# Patient Record
Sex: MALE | Race: WHITE | NOT HISPANIC OR LATINO | URBAN - METROPOLITAN AREA
[De-identification: names, ages, dates, MRNs, and addresses within clinical notes are randomized per-mention and may not be internally consistent; named-entity substitution may affect disease eponyms.]

---

## 2017-08-23 ENCOUNTER — OFFICE VISIT - RIVER FALLS (OUTPATIENT)
Dept: FAMILY MEDICINE | Facility: CLINIC | Age: 42
End: 2017-08-23

## 2017-08-23 ASSESSMENT — MIFFLIN-ST. JEOR: SCORE: 1507.68

## 2020-06-08 ENCOUNTER — OFFICE VISIT - RIVER FALLS (OUTPATIENT)
Dept: FAMILY MEDICINE | Facility: CLINIC | Age: 45
End: 2020-06-08

## 2022-02-11 VITALS
HEIGHT: 67 IN | SYSTOLIC BLOOD PRESSURE: 130 MMHG | HEART RATE: 92 BPM | BODY MASS INDEX: 23.32 KG/M2 | OXYGEN SATURATION: 98 % | DIASTOLIC BLOOD PRESSURE: 84 MMHG | WEIGHT: 148.6 LBS

## 2022-02-11 VITALS
BODY MASS INDEX: 23.49 KG/M2 | DIASTOLIC BLOOD PRESSURE: 82 MMHG | OXYGEN SATURATION: 98 % | TEMPERATURE: 98.1 F | WEIGHT: 150 LBS | SYSTOLIC BLOOD PRESSURE: 134 MMHG | HEART RATE: 79 BPM

## 2022-02-16 NOTE — NURSING NOTE
Comprehensive Intake Entered On:  6/8/2020 2:30 PM CDT    Performed On:  6/8/2020 2:16 PM CDT by Taya Sheikh   Chief Complaint :   c/o R wrist pain x 5 days.   Weight Measured :   150.0 lb(Converted to: 150 lb 0 oz, 68.04 kg)    Systolic Blood Pressure :   134 mmHg   Diastolic Blood Pressure :   82 mmHg   Mean Arterial Pressure :   99 mmHg   Peripheral Pulse Rate :   79 bpm   BP Site :   Right arm   BP Method :   Manual   HR Method :   Electronic   Temperature Tympanic :   98.1 DegF(Converted to: 36.7 DegC)    Oxygen Saturation :   98 %   Taya Sheikh - 6/8/2020 2:16 PM CDT   Health Status   Allergies Verified? :   Yes   Medication History Verified? :   Yes   Medical History Verified? :   Yes   Pre-Visit Planning Status :   N/A   Tobacco Use? :   Never smoker   Taya Sheikh - 6/8/2020 2:16 PM CDT   Consents   Consent for Immunization Exchange :   Consent Granted   Consent for Immunizations to Providers :   Consent Granted   Taya Sheikh - 6/8/2020 2:16 PM CDT   Meds / Allergies   (As Of: 6/8/2020 2:30:08 PM CDT)   Allergies (Active)   No known allergies  Estimated Onset Date:   Unspecified ; Created By:   Linda Guy; Reaction Status:   Active ; Category:   Drug ; Substance:   No known allergies ; Type:   Allergy ; Updated By:   Linda Guy; Source:   Patient ; Reviewed Date:   6/8/2020 2:28 PM CDT        Medication List   (As Of: 6/8/2020 2:30:08 PM CDT)   No Known Home Medications     Taya Sheikh - 6/8/2020 2:28:14 PM           ID Risk Screen   Recent Travel History :   No recent travel   Family Member Travel History :   No recent travel   Other Exposure to Infectious Disease :   Unknown   Taya Sheikh - 6/8/2020 2:16 PM CDT

## 2022-02-16 NOTE — PROGRESS NOTES
Chief Complaint    c/o R wrist pain x 5 days.  History of Present Illness      Patient complains of 4 days of right wrist pain.  Discomfort is on the hyperthenar aspect of the wrist.  Worse with movement.  No morning stiffness.  No weakness or paresthesias.  No trauma.  No other current joint complaints.  No rash or diarrhea.  No fever or chills.  No history of tick exposure.  Had a episode of bilateral wrist pain and swelling after several days of intense activity involving the hands using a sandblaster that resolved over a period of time.  He is taken nothing for his discomfort.  Review of Systems      No fever, chills, rash, swollen joints, morning stiffness, dyspnea, cough, chest pain, diarrhea, urinary complaints.  Physical Exam   Vitals & Measurements    T: 98.1   F (Tympanic)  HR: 79(Peripheral)  BP: 134/82  SpO2: 98%     WT: 150.0 lb       Patient is a healthy-appearing man in no distress.  Alert and oriented.  The wrist both appear normal there is no swelling in either hand or wrist.  No warmth erythema or tenderness in either wrist.  Range of motion on both sides is normal.  Strength and sensation intact in both upper extremities.  No other joint pathology.  Assessment/Plan       Acute pain of right wrist (M25.531)         X-ray and exam negative. OTC analgesic prn. Return if not better.         Ordered:          15951 office outpatient new 30 minutes (Charge), Quantity: 1, Acute pain of right wrist          XR Wrist Min 3 Views Right (Request), Priority: STAT, Acute pain of right wrist           Patient Information     Name:JESSICA ROMERO      Address:      [address] [place] [id number]     Sex:Male     YOB: 1975     Phone:[phone]     MRN:[id number]     FIN:[id number]     Location:[facility]     Date of Service:06/08/2020      Primary Care Physician:       Douglas Schaffer MD, [phone]      Attending Physician:       Aaron Latham MD  [phone]  Problem List/Past Medical History    Ongoing     No qualifying data    Historical     No qualifying data  Medications   No active medications  Allergies    No known allergies  Social History    Smoking Status - 06/08/2020     Never smoker     Alcohol - Current, 06/20/2010      Current, 1-2 times per month, 06/20/2010     Home/Environment      Marital status: Single., 06/06/2013     Substance Abuse - Denies Substance Abuse, 06/20/2010      Never, 06/06/2013     Tobacco - Denies Tobacco Use, 06/20/2010      Never, 06/06/2013  Family History    Breast Cancer: Mother.    Ovarian cancer.: Mother.  Immunizations      Vaccine Date Status          Td 01/01/1999 Recorded  Diagnostic Results   X-ray negative.

## 2022-02-16 NOTE — PROGRESS NOTES
Patient:   JESSICA ROMERO            MRN: [id number]            FIN: [id number]               Age:   41 years     Sex:  Male     :  1975   Associated Diagnoses:   Acute foreign body of left eye   Author:   Carlos Chacko MD      Chief Complaint   2017 6:53 PM CDT    Pt here for concern with something in his eye, he can feel something in his left eye, was working on car, unsure if rust or dirt, this occured a short time ago      History of Present Illness   see chief complaint as noted above and confirmed with the patient     41 year old male here today with concern of something in his left eye. He was working on his car within the past hour when he felt something fall into his left eye. He is unsure of what it is could, possibly rust or dust. His eye is not running, denies other symptoms.       Review of Systems   Constitutional:  No fever.    Eye:  Foreign Body in Left eye , No discharge.    Integumentary:  No rash.    Neurologic:  Alert and oriented X4.             Health Status   Allergies:    Allergic Reactions (Selected)  No known allergies   Medications:  (Selected)      Problem list:    No problem items selected or recorded.      Histories   Past Medical History:    No active or resolved past medical history items have been selected or recorded.   Family History:    No family history items have been selected or recorded.   Procedure history:    No active procedure history items have been selected or recorded.   Social History:        Alcohol Assessment: Current            Current, 1-2 times per month      Tobacco Assessment: Denies Tobacco Use            Never      Substance Abuse Assessment: Denies Substance Abuse            Never      Home and Environment Assessment            Marital status: Single.        Physical Examination   Vital Signs   2017 6:53 PM CDT Peripheral Pulse Rate 92 bpm    Pulse Site Radial artery    Systolic Blood Pressure 130 mmHg    Diastolic Blood Pressure 84  mmHg    Mean Arterial Pressure 99 mmHg    BP Site Right arm    Oxygen Saturation 98 %      Measurements from flowsheet : Measurements   8/23/2017 6:53 PM CDT Height Measured - Standard 67 in    Weight Measured - Standard 148.6 lb    BSA 1.78 m2    Body Mass Index 23.27 kg/m2      General:  Alert and oriented, No acute distress.    Eye:  Pupils are equal, round and reactive to light, Normal conjunctiva.    HENT:  Oral mucosa is moist.    Neck:  Supple.    Respiratory:  Respirations are non-labored.    Cardiovascular:  Normal rate, Regular rhythm, No edema.    Gastrointestinal:  Non-distended.    Musculoskeletal:  Normal gait.    Integumentary:  Warm, No rash.    Neurologic:  Alert, Oriented.    Psychiatric:  Cooperative, Appropriate mood & affect, Normal judgment.       Review / Management   Results review      Impression and Plan       Diagnosis     Acute foreign body of left eye ([id number] T15.92XA).     Course:  Improving.    Plan:  Eyes numbed with Proparicaine- in floor scene was given.  Essential pupil there is a small pit. Dark piece of roast or dirt was embedded and this washed out with flushing. No other foreign body was seen. No significant abrasions or abnormalities of lid mucosa    Object moved and should wash out with tears.      .    Summary:  Informed patient if any further issues with left eye he should schedule an appt to see an eye doctor. He verbalized understanding of this. .    Tiara PHAM Medical Assistant acted solely as a scribe for, and in presence of Dr. Carlos Chacko who performed the services.